# Patient Record
(demographics unavailable — no encounter records)

---

## 2024-10-22 NOTE — HISTORY OF PRESENT ILLNESS
[de-identified] : Patient with history of hypertension, hyperlipidemia, hypothyroidism, type 2 diabetes mellitus and coronary artery disease, asthma presents to office for annual wellness exam and routine follow-up.  She feels well and denies any exertional chest pain, shortness of breath, palpitations, polyuria or polydipsia. Patient states that she has been adding salt to her food at the kitchen table

## 2024-10-22 NOTE — PHYSICAL EXAM
[No Acute Distress] : no acute distress [Normal Sclera/Conjunctiva] : normal sclera/conjunctiva [PERRL] : pupils equal round and reactive to light [EOMI] : extraocular movements intact [Normal Oropharynx] : the oropharynx was normal [No Lymphadenopathy] : no lymphadenopathy [Thyroid Normal, No Nodules] : the thyroid was normal and there were no nodules present [No Carotid Bruits] : no carotid bruits [No Abdominal Bruit] : a ~M bruit was not heard ~T in the abdomen [No Varicosities] : no varicosities [No Edema] : there was no peripheral edema [No Palpable Aorta] : no palpable aorta [Normal Appearance] : normal in appearance [No Masses] : no palpable masses [No Nipple Discharge] : no nipple discharge [No Axillary Lymphadenopathy] : no axillary lymphadenopathy [Normal] : soft, non-tender, non-distended, no masses palpated, no HSM and normal bowel sounds [Normal Supraclavicular Nodes] : no supraclavicular lymphadenopathy [Normal Axillary Nodes] : no axillary lymphadenopathy [Normal Posterior Cervical Nodes] : no posterior cervical lymphadenopathy [Normal Anterior Cervical Nodes] : no anterior cervical lymphadenopathy [Normal Inguinal Nodes] : no inguinal lymphadenopathy [No CVA Tenderness] : no CVA  tenderness [No Spinal Tenderness] : no spinal tenderness [No Rash] : no rash [No Skin Lesions] : no skin lesions [Coordination Grossly Intact] : coordination grossly intact [No Focal Deficits] : no focal deficits [Normal Gait] : normal gait [Normal Affect] : the affect was normal [Normal Insight/Judgement] : insight and judgment were intact

## 2024-10-22 NOTE — ASSESSMENT
[FreeTextEntry1] : Stable Advise regular aerobic activity and heart healthy diet, weight Will refer to [facility] weight management program Check routine blood work Recommendations as above Patient refused influenza and pneumococcal vaccinations. Advise she should receive flu vaccine, RSV and COVID vaccination

## 2024-10-22 NOTE — HEALTH RISK ASSESSMENT
[Little interest or pleasure doing things] : 1) Little interest or pleasure doing things [Feeling down, depressed, or hopeless] : 2) Feeling down, depressed, or hopeless [0] : 2) Feeling down, depressed, or hopeless: Not at all (0) [PHQ-2 Negative - No further assessment needed] : PHQ-2 Negative - No further assessment needed [Patient reported mammogram was normal] : Patient reported mammogram was normal [Patient reported PAP Smear was normal] : Patient reported PAP Smear was normal [Patient reported colonoscopy was normal] : Patient reported colonoscopy was normal [[id number]] : 0 [MammogramDate] : 10/2023 [PapSmearDate] : 2024 [ColonoscopyDate] : 2024

## 2024-11-19 NOTE — PHYSICAL EXAM
[Normal Sclera/Conjunctiva] : normal sclera/conjunctiva [Normal] : normal rate, regular rhythm, normal S1 and S2 and no murmur heard [No Edema] : there was no peripheral edema [No CVA Tenderness] : no CVA  tenderness

## 2024-11-19 NOTE — HISTORY OF PRESENT ILLNESS
[FreeTextEntry1] : Routine follow-up [de-identified] : Patient with history of hypertension, hyperlipidemia, type 2 diabetes mellitus, hypothyroidism and coronary artery disease presents to office for routine follow-up blood pressure check. During her last visit losartan 25 mg was initiated for elevated BP She states compliance with all medication

## 2024-12-11 NOTE — REVIEW OF SYSTEMS
[Patient Intake Form Reviewed] : Patient intake form was reviewed [Shortness Of Breath] : shortness of breath [Wheezing] : wheezing [Cough] : cough [Negative] : Allergic/Immunologic [Chest Pain] : chest pain [Fatigue] : fatigue [Skin Rash] : skin rash [Fever] : no fever [Chills] : no chills [Night Sweats] : no night sweats [Dysphagia] : no dysphagia [Loss of Hearing] : no loss of hearing [Nosebleeds] : no nosebleeds [Hoarseness] : no hoarseness [Odynophagia] : no odynophagia [Mucosal Pain] : no mucosal pain [Palpitations] : no palpitations [Leg Claudication] : no intermittent leg claudication [Lower Ext Edema] : no lower extremity edema [Abdominal Pain] : no abdominal pain [Vomiting] : no vomiting [Reflux/Heartburn] : no reflux/ heartburn [Hernia] : no hernia [Confused] : no confusion [Dizziness] : no dizziness [Fainting] : no fainting [Difficulty Walking] : no difficulty walking [FreeTextEntry3] : vision change [FreeTextEntry4] : dry mouth [FreeTextEntry7] : fecal incontinence [de-identified] : rashes under breasts and abdominal pannus [de-identified] : migraines

## 2024-12-11 NOTE — HISTORY OF PRESENT ILLNESS
[de-identified] : DONNY BARRON is a 70-year-old F with a long-standing Hx of obesity who presents today for initial evaluation for weight management options.   Heaviest/current wt: 230 lbs/230 lbs Diets/exercise programs tried in the past: Keto-, WW (no weight loss), diet center in [place] + medication (56 lb weight loss; reports side effect of panic from medication), intermittent fasting + coffee (current) Weight loss medications tried in the past: possibly Phentermine, Trulicity (14 lb weight loss) Reasons for stopping weight loss medication: panic/anxiety   PMH and FH of: pancreatitis: no gallstones: no kidney stones: no MEN syndrome: no Medullary thyroid cancer: no Anxiety: yes Eating disorder: no Glaucoma: no Recent visual changes: no Ophthalmological Contraindications: no Currently taking narcotic pain medication(s) or suboxone: no   History of bariatric surgery: no Obesity comorbidities: HLD, HTN, CAD (s/p stent placement 8-9 years ago), DM (on Metformin 500 mg BID), OA Comorbidities improved/resolved: n/a Anti-obesity medication: none   Current dietary lifestyle: Breakfast: coffee Lunch: skips Dinner: At 6-6:30 PM: fish/chicken/steak, vegetables Snacks: none Drinks: water with electrolytes (~64 oz/day), coffee (DD Bulgarian Vanilla; 5-6 cups/day until ~9PM)   Activity Lifestyle: Sleep: ~8 hrs (reports snoring) Physical activity/exercise: daily activity (exercise limited by R knee and R shoulder pain) Work: hairdresser Smoking/EtOH: nonsmoker; no EtOH use   Last Colonoscopy: 2024 Last Mammogram: 2024

## 2024-12-11 NOTE — PHYSICAL EXAM
[Obese, well nourished, in no acute distress] : obese, well nourished, in no acute distress [Normal] : affect appropriate [de-identified] : soft, NT, ND, no evidence of hernia; diastasis appreciated; obese

## 2024-12-11 NOTE — HISTORY OF PRESENT ILLNESS
[de-identified] : DONNY BARRON is a 70-year-old F with a long-standing Hx of obesity who presents today for initial evaluation for weight management options.   Heaviest/current wt: 230 lbs/230 lbs Diets/exercise programs tried in the past: Keto-, WW (no weight loss), diet center in [place] + medication (56 lb weight loss; reports side effect of panic from medication), intermittent fasting + coffee (current) Weight loss medications tried in the past: possibly Phentermine, Trulicity (14 lb weight loss) Reasons for stopping weight loss medication: panic/anxiety   PMH and FH of: pancreatitis: no gallstones: no kidney stones: no MEN syndrome: no Medullary thyroid cancer: no Anxiety: yes Eating disorder: no Glaucoma: no Recent visual changes: no Ophthalmological Contraindications: no Currently taking narcotic pain medication(s) or suboxone: no   History of bariatric surgery: no Obesity comorbidities: HLD, HTN, CAD (s/p stent placement 8-9 years ago), DM (on Metformin 500 mg BID), OA Comorbidities improved/resolved: n/a Anti-obesity medication: none   Current dietary lifestyle: Breakfast: coffee Lunch: skips Dinner: At 6-6:30 PM: fish/chicken/steak, vegetables Snacks: none Drinks: water with electrolytes (~64 oz/day), coffee (DD Angolan Vanilla; 5-6 cups/day until ~9PM)   Activity Lifestyle: Sleep: ~8 hrs (reports snoring) Physical activity/exercise: daily activity (exercise limited by R knee and R shoulder pain) Work: hairdresser Smoking/EtOH: nonsmoker; no EtOH use   Last Colonoscopy: 2024 Last Mammogram: 2024

## 2024-12-11 NOTE — ASSESSMENT
[FreeTextEntry1] : DONNY BARRON is a 70-year-old F with a long-standing h/o obesity, who presents today for initial evaluation for weight management options. Of note patient's hemoglobin A1c in October 2024 was 8.0 and TSH was 20.4 patient has increased dose of levothyroxine   Had a lengthy discussion with the patient regarding nutrition, exercise, weight loss medications, and bariatric surgery. The patient qualifies for bariatric surgery but is not interested at this time. Reviewed surgical options such as the gastric Steven-en-Y bypass and Sleeve Gastrectomy, and the risks and benefits of each. Discussed how bariatric surgery may offer greater weight loss results with better long-term success. The patient qualifies for and is currently most interested in weight loss medications. The following risks of bariatric surgery were discussed with the patient with diagrams. All questions answered.   Complications were discussed including but not limited to: vitamin and protein deficiencies, pneumonia, urinary infection, wound infection, leaks/peritonitis possibly requiring intraabdominal drains or reoperation, bleeding, DVT, pulmonary embolus, severe reflux, sleeve obstruction, abdominal wall hernias, gallstone formation, revisions, death, inadequate weight loss. The importance of vitamins and protein supplementation was stressed, as was the importance of follow-up and exercise.   Health maintenance and behavioral/nutrition counseling for obesity: An additional 30 minutes of the visit was spent counseling the patient regarding need for weight loss and behavior modification including nutrition and proper eating habits, including which foods to eat and avoid.   Emphasized the importance of making healthier food choices including fresh fruits and vegetables, lean meats, and protein sources. Recommended front-loading calories, incorporating whole grains, and eliminating fast foods. Also discussed the importance of avoiding fried/fatty foods and foods containing high sugar content including juices/shakes/sodas/desserts.   Also encouraged beginning an exercise program and recommended cardiovascular exercises along with strength-training to build lean muscle. Made suggestions on different types of exercises to try.   Patient will work on the following:   Aim for 64 oz water/day Limit liquid calories Focus on eating 3 well-balanced meals with lean protein (70-80 g/day) and fiber during the daytime with appropriate portion size Avoid skipping meals (i.e. try to frontload calories earlier in the day) Decrease coffee consumption Stop coffee/caffeine consumption by 12 PM Cooking fresh meals rather than take out/processed/ready-made foods Try to make vegetables the largest portion of each meal, followed by a lean protein (e.g. lentils, chicken, fish), and finally a complex carbohydrate if still hungry (e.g. sweet potato, farro, quinoa) Incorporating exercise (aim for 150 mins/week with both cardio- and strength-training); track steps Use hairdryer on cool and Gold Bond powder to keep area under breasts and abdominal pannus dry; if this is ineffective and develop a rash, call the office for Nystatin powder Rx. See Pulmonology for Sleep Study due to snoring Pt seen by Nutritionist Jennifer Cano at today's visit (15 min)    Start Mounjaro based on labs and authorization. All risks and benefits have been discussed with the patient. Currently there are no contraindications for the use of Mounjaro after reviewing the pt's medical history and labs, including personal and family history of thyroid cancer or MEN 2 Syndrome. Possible side effects of Mounjaro were discussed with the patient, including nausea, abdominal pain, reflux, constipation, delayed gastric emptying, gallstones, kidney stones, visual changes, and pancreatitis. Pt verbalizes understanding and wishes to proceed with medical therapy. Instructions for use provided. Pt understands the need for long-term use and understands the risk of weight-gain upon stopping weight loss medications.  Pt to have labs (i.e. CMP or BMP) every 3 months for weight loss medication management upon starting Mounjaro.   Patient educated to call with questions/concerns All questions answered   Schedule follow-up for 3 weeks after starting weight loss medication.   Additional time spent before and after visit reviewing chart.

## 2024-12-11 NOTE — PHYSICAL EXAM
[Obese, well nourished, in no acute distress] : obese, well nourished, in no acute distress [Normal] : affect appropriate [de-identified] : soft, NT, ND, no evidence of hernia; diastasis appreciated; obese

## 2025-03-07 NOTE — ASSESSMENT
[FreeTextEntry1] :   71 year old F with a longstanding history of obesity presents for a weight-loss medication follow-up after having made nutrition and exercise changes since last visit. 7 pound weight loss since last visit.   Had a lengthy discussion with the patient regarding nutrition, exercise, weight loss medications.   Patient will work on the following: -Meet with nutritionist and follow up with nutrition classes -Eliminate snacks -Increase zero calorie fluid intake to at least 64 oz/day -Focus on eating 3 well-balanced meals during the daytime with appropriate portion size -Cooking fresh meals rather than take out/processed/ready-made foods -Incorporating exercise; walk 8-10k steps daily -Check finger sticks -Last blood work due -Continue Mounjaro 5 mg/wk -Follow up with PCP      Pt verbalized understanding of the above Pt will call office immediately for any side effects. Pt verbalizes understanding and wishes to proceed with medical therapy. Patient educated to call with questions/concerns. All questions answered.   Return to office 1 month, on day nutritionist Jennifer Cano is in office   Additional time spent before and after visit reviewing chart.

## 2025-03-07 NOTE — PHYSICAL EXAM
[Obese, well nourished, in no acute distress] : obese, well nourished, in no acute distress [Normal] : affect appropriate [de-identified] : soft, NT, ND, no evidence of hernia; diastasis appreciated; obese

## 2025-03-07 NOTE — REVIEW OF SYSTEMS
[Patient Intake Form Reviewed] : Patient intake form was reviewed [Shortness Of Breath] : shortness of breath [Wheezing] : wheezing [Cough] : cough [Diarrhea] : diarrhea [Negative] : Allergic/Immunologic [FreeTextEntry7] : nausea

## 2025-03-07 NOTE — HISTORY OF PRESENT ILLNESS
[de-identified] : 71 year old F with a longstanding history of obesity presents for a weight-loss medication follow-up after having made nutrition and exercise changes since last visit. 7 pound weight loss since last visit 12/2024. pt reports mild nausea and diarrhea.    Patient is not always consuming adequate protein, drinks adequate water, minimal activity, sleeps well. Pt is not routinely checking finger sticks.  Starting weight at initial consult: 230 (12/10/2024) Current weight at today's visit: 223   Anti-obesity medication(s): mounjaro Start date:12/10/2024 Current dose: 5 mg/week Side-effects from anti-obesity medication(s): mild nausea and diarrhea Obesity comorbidities: DM, HTN, hyperlipidemia Comorbidities improved/resolved: labs pending History of bariatric surgery: no

## 2025-05-06 NOTE — HISTORY OF PRESENT ILLNESS
[de-identified] : 71 year old F with a longstanding history of obesity presents for a weight-loss medication follow-up after having made nutrition and exercise changes since last visit. 4 pounds weight loss since last visit.  Patient surprised that she lost weight as she was baking a lot for Easter holidays.  Patient tries to eat 3 protein focused meals per day, drinks plenty of water, and does have some snacks cookies spinach pie peanuts.  Patient is walking and getting 7 to 8 hours of sleep.  Patient more reports mild nausea for 2 days after injection and some constipation.  Patient reports no reflux.  Over the last month has noted blurry vision.     Patient did test positive for depression screening recommended follow-up with psychologist Dr. John Wiley and PCP.   Patient is not always consuming adequate protein, drinks adequate water, minimal activity, sleeps well. Pt is not routinely checking finger sticks.  Starting weight at initial consult: 230 (12/10/2024) Current weight at today's visit: 219   Anti-obesity medication(s): mounjaro Start date:12/10/2024 Current dose: 5 mg/week Side-effects from anti-obesity medication(s): mild nausea and diarrhea Obesity comorbidities: DM, HTN, hyperlipidemia Comorbidities improved/resolved: Improved hemoglobin A1c from 8 to 6.2 History of bariatric surgery: no

## 2025-05-06 NOTE — REVIEW OF SYSTEMS
[Constipation] : constipation [Negative] : Allergic/Immunologic [FreeTextEntry3] : Blurred vision [FreeTextEntry7] : Mild nausea

## 2025-05-06 NOTE — PHYSICAL EXAM
[Obese, well nourished, in no acute distress] : obese, well nourished, in no acute distress [Normal] : affect appropriate [de-identified] : obese, soft, nontender, no evidence of hernia

## 2025-05-06 NOTE — ASSESSMENT
[FreeTextEntry1] :  71 year old F with a longstanding history of obesity presents for a weight-loss medication follow-up currently on Mounjaro 5 mg/week lost 4 pounds since last visit.  Nutrition and exercise guidelines were again reviewed with the patient.   Recent labs March 2025 revealed improvement of heme and hemoglobin A1c 8 down to 6.2.    Had a lengthy discussion with the patient regarding nutrition, exercise, weight loss medications.   Patient will work on the following: -Eliminate snacks -Increase zero calorie fluid intake to at least 64 oz/day -Focus on eating 3 well-balanced meals during the daytime with appropriate portion size -Cooking fresh meals rather than take out/processed/ready-made foods -Incorporating exercise; walk 8-10k steps daily -Check finger sticks -Follow-up with psychologist DR. Wiley - Hold Mounjaro 5 mg/wk new blurred vision -Follow-up with ophthalmologist -Follow up with PCP   -Patient seen by nutritionjose carlos Cano at today's visit, 15 minutes   Pt verbalized understanding of the above Pt will call office immediately for any side effects. Pt verbalizes understanding and wishes to proceed with medical therapy. Patient educated to call with questions/concerns. All questions answered.   Return to office 1 month, on day nutritionjose carlos Cano is in office   Additional time spent before and after visit reviewing chart.

## 2025-05-21 NOTE — END OF VISIT
----- Message from Jian Bailey MD sent at 5/20/2025  2:09 PM EDT -----  Can we see if we get this patient in for an earlier EGD colonoscopy.  Her procedures are scheduled in June but she is off from work until June 16 so she is trying to see if she could do this before she does not have to take any additional days off.  Maybe we can get her squeezed in on one of the days I am at [place] doing inpatient.   Called pt to offer 05/29/2025 @ [facility]  and sent Mychart    [Time Spent: ___ minutes] : I have spent [unfilled] minutes of time on the encounter which excludes teaching and separately reported services. Orthopedic